# Patient Record
(demographics unavailable — no encounter records)

---

## 2024-11-17 NOTE — ASSESSMENT
[FreeTextEntry1] : ELLE DAVID is a 71 year old M who presents today Nov 16, 2024 with the above history and the following active issues:   Renal transplant eval has been deferred d/t oncologic hx for at least 5 years per patient If listed again will need surveillance cardiac imaging such as stress testing apt  will need to be coordinated between transplant team and electrophysiology  Although no recent AF has been reported, discussed risk of recurrent AF and associated risk of VTE. Pt prev unable to tolerate AC. Discussed with EP to arrange further eval for left atrial appendage occlusion. Their office to facilitate. Cont baby aspirin interim.   PAF Tachy charline S/P Micra AV PPM.  Last in office check 9/2024, cont remote monitoring Off amiodarone and eliquis  Eliquis has been on hold d/t bleeding complications post op (gastric ca), low platelets, and fall risk No recent bleeding issues, hgb is stable, but remains off anticoagulation Hem/onc is Dr. Oviedo. Monitor CBC.  CPAP for obstructive sleep apnea.   HTN/HHD/HF PEF/ ESRD Cardiomegaly Valvular heart disease, ao dil. Volume mgt with HD, off diuretics, nephrology follow up Stable echo findings- annual surveillance Intol GDMT d/t hypotension  Discussed red flag symptoms which would warrant urgent re-evaluation. Any questions and concerns were addressed and resolved.   Sincerely,  ROSA Tejada Patients history, testing, and plan reviewed with supervising MD: Dr. Surinder Richmond

## 2024-11-17 NOTE — HISTORY OF PRESENT ILLNESS
[FreeTextEntry1] : ELLE DAVID  is a 71 year old  M  History of longstanding refractory hypertension complicated by hypertensive heart disease, HF PEF, aortic dilatation, HD, mixed dyslipidemia, atherosclerosis, pre diabetes, PAF, AVB s/p PPM, chronic renal insufficiency now ESRD, obstructive sleep apnea, anemia, MGUS, prostate ca, prior colon cancer status post resection, gastric cancer, thrombocytopenia, neuropathy  Now receives dialysis via an AV fistula at [facility] end dialysis with Dr. German. 3x/wk.  underwent thrombectomy angioplasty and stenting of AV fistula  March 2021 symptomatic atrial flutter with symptoms of dizziness started on anticoagulation had LINETTE without clot, s/p DCCV unsuccessful had early recurrence of atrial arrhythmia started on amiodarone and anticoagulation  July '21 high degree HB noted on monitor with symptomatic dizziness and falls at home  S/P Micra AV PPM 7/27/21 [facility].  Off amio.   Followed by EP.  eveline was discussed  Pt reports has been off AC for quite some time.  He is tolerating baby aspirin  There is no exertional chest pain, pressure or discomfort.  There is no significant dyspnea on exertion or orthopnea.  THere is no evidence of fluid overload.   Last office visit he was referred to arrhythmia specialist  he remains off anticoagulation  there is discussion of left atrial appendage occlusion  there was discussion of transplant in [facility]  he reports this is being delayed for at least 5 years d/t oncologic hx there has been improvement in his hemodialysis  he has had less issues with tachycardia and hypotension   last office pacemaker check 9/2024 was unremarkable   Echo 4/2024 Grossly normal LV function, aortic root 4.1cm, asc ao 3.9cm, mild VHD Nuclear stress test 2/2024 small mild apical lateral fixed defect s/o attenuation  Carotid Doppler July 2022 mild nonobstructive atherosclerosis bilaterally

## 2024-11-17 NOTE — HISTORY OF PRESENT ILLNESS
Detail Level: Simple [FreeTextEntry1] : ELLE DAVID  is a 71 year old  M  History of longstanding refractory hypertension complicated by hypertensive heart disease, HF PEF, aortic dilatation, HD, mixed dyslipidemia, atherosclerosis, pre diabetes, PAF, AVB s/p PPM, chronic renal insufficiency now ESRD, obstructive sleep apnea, anemia, MGUS, prostate ca, prior colon cancer status post resection, gastric cancer, thrombocytopenia, neuropathy  Now receives dialysis via an AV fistula at [facility] end dialysis with Dr. German. 3x/wk.  underwent thrombectomy angioplasty and stenting of AV fistula  March 2021 symptomatic atrial flutter with symptoms of dizziness started on anticoagulation had LINETTE without clot, s/p DCCV unsuccessful had early recurrence of atrial arrhythmia started on amiodarone and anticoagulation  July '21 high degree HB noted on monitor with symptomatic dizziness and falls at home  S/P Micra AV PPM 7/27/21 [facility].  Off amio.   Followed by EP.  eveline was discussed  Pt reports has been off AC for quite some time.  He is tolerating baby aspirin  There is no exertional chest pain, pressure or discomfort.  There is no significant dyspnea on exertion or orthopnea.  THere is no evidence of fluid overload.   Last office visit he was referred to arrhythmia specialist  he remains off anticoagulation  there is discussion of left atrial appendage occlusion  there was discussion of transplant in [facility]  he reports this is being delayed for at least 5 years d/t oncologic hx there has been improvement in his hemodialysis  he has had less issues with tachycardia and hypotension   last office pacemaker check 9/2024 was unremarkable   Echo 4/2024 Grossly normal LV function, aortic root 4.1cm, asc ao 3.9cm, mild VHD Nuclear stress test 2/2024 small mild apical lateral fixed defect s/o attenuation  Carotid Doppler July 2022 mild nonobstructive atherosclerosis bilaterally  Detail Level: Zone

## 2024-11-17 NOTE — ADDENDUM
[FreeTextEntry1] : Please note the patient was reviewed with NP Tanisha Ruth. I was physically present during the service of the patient. I was directly involved in the management plan and recommendations of the care provided to the patient.  I personally reviewed the history and physical examination as documented by the NP above.

## 2025-03-14 NOTE — ASSESSMENT
[FreeTextEntry1] : ELLE DAVID is a 72 year old M who presents today Mar 14, 2025 with the above history and the following active issues:   Renal transplant eval has been deferred d/t oncologic hx for at least 5 years per patient (?2026) If listed again will need surveillance cardiac imaging such as stress testing apt will need to be coordinated between transplant team and electrophysiology   Although no recent AF has been reported, discussed risk of recurrent AF and associated risk of VTE. Now S/P watchman 12/27/24 LINETTE 3/3/25 Currently on DAPT pt reports no bleeding. Will touch base with EP re need for DAPT ? 6 months post Watchman per report. Call pt with any anticipated med changes. Bleeding precautions reviewed.   PAF Tachy charline S/P Micra AV PPM.  Last in office check 9/2024, cont remote monitoring Off amiodarone and eliquis  Eliquis has been on hold d/t bleeding complications post op (gastric ca), low platelets, and fall risk No recent bleeding issues, hgb is stable, but remains off anticoagulation Hem/onc is Dr. Oviedo. Monitor CBC.  CPAP for obstructive sleep apnea.   HTN/HHD/HF PEF/ ESRD Cardiomegaly Valvular heart disease, ao dil. Volume mgt with HD, off diuretics, nephrology follow up Stable echo findings- annual surveillance Intol GDMT d/t hypotension  Will plan next followup to reconcile meds Discussed red flag symptoms which would warrant urgent re-evaluation. Any questions and concerns were addressed and resolved.   Sincerely,   ROSA Tejada Patient's history, testing, medications, and any relative changes to plan of care reviewed with supervising MD: Dr. Surinder Richmond

## 2025-03-14 NOTE — HISTORY OF PRESENT ILLNESS
[FreeTextEntry1] : ELLE DAVID  is a 72 year old  M  History of longstanding refractory hypertension complicated by hypertensive heart disease, HF PEF, aortic dilatation, HD, mixed dyslipidemia, atherosclerosis, pre diabetes, PAF, AVB s/p PPM, chronic renal insufficiency now ESRD, obstructive sleep apnea, anemia, MGUS, prostate ca, prior colon cancer status post resection, gastric cancer, thrombocytopenia, neuropathy  Now receives dialysis via an AV fistula at [facility] end dialysis with Dr. German. 3x/wk.  underwent thrombectomy angioplasty and stenting of AV fistula  March 2021 symptomatic atrial flutter with symptoms of dizziness started on anticoagulation had LINETTE without clot, s/p DCCV unsuccessful had early recurrence of atrial arrhythmia started on amiodarone and anticoagulation  July '21 high degree HB noted on monitor with symptomatic dizziness and falls at home  S/P Micra AV PPM 7/27/21 [facility].  Off amio.   Followed by EP.  Has been off AC for quite some time.  He is tolerating baby aspirin  There is no exertional chest pain, pressure or discomfort.  There is no significant dyspnea on exertion or orthopnea.  There is no evidence of fluid overload.   Last office visit he was referred to arrhythmia specialist  he remains off anticoagulation  S/P watchman 12/2024  Pt states he is currently taking ASA 81mg and plavix 75mg, he denies bleeding events.  LINETTE 3/2025 LVEF 55% mild VHD, watchman closure present positioned normally, interatrial shunt, mild VHD  there was discussion of transplant in [facility]  he reports this is being delayed for at least 5 years d/t oncologic hx (maybe 2026 revisiting) there has been improvement in his hemodialysis  he has had less issues with tachycardia and hypotension   last office pacemaker check 9/2024 was unremarkable   Echo 4/2024 Grossly normal LV function, aortic root 4.1cm, asc ao 3.9cm, mild VHD Nuclear stress test 2/2024 small mild apical lateral fixed defect s/o attenuation  Carotid Doppler July 2022 mild nonobstructive atherosclerosis bilaterally

## 2025-07-14 NOTE — HISTORY OF PRESENT ILLNESS
[FreeTextEntry1] : ELLE DAVID  is a 72 year old  M Last office visit was March 25 interim device check was unremarkable this was performed remotely in March and in June last office device check was in September device follow-up will be scheduled he is now on single antiplatelet therapy today's EKG demonstrates atrial flutter prior infarct atrial flutter and infarct pattern is new as compared to prior EKG a follow-up echo and stress has been requested clinical follow-up after requested stress test outside blood work June 25 hemoglobin 13.0 LDL 99 A1c 4.7 also recommend addition of statin therapy for further cardiovascular risk reduction shared decision making.  Discussed importance of lipid-lowering therapy for cardiovascular risk reduction.  Start low-dose preventive statin therapy.  Follow-up blood work.  Instructed to call if adverse effect.  Clinical follow-up at the scheduled stress test and echo.  Instructed to notify our office of any new symptoms.  He remains active around the house.  No recent hospitalizations.  His blood pressure has been low at dialysis.  He is going to be listed for transplant if oncologic status remains stable History of longstanding refractory hypertension complicated by hypertensive heart disease, HF PEF, aortic dilatation, HD, mixed dyslipidemia, atherosclerosis, pre diabetes, PAF, AVB s/p PPM, chronic renal insufficiency now ESRD, obstructive sleep apnea, anemia, MGUS, prostate ca, prior colon cancer status post resection, gastric cancer, thrombocytopenia, neuropathy  Now receives dialysis via an AV fistula at [facility] with Dr. German. 3x/wk.  underwent thrombectomy angioplasty and stenting of AV fistula  March 2021 symptomatic atrial flutter with symptoms of dizziness started on anticoagulation had LINETTE without clot, s/p DCCV unsuccessful had early recurrence of atrial arrhythmia started on amiodarone and anticoagulation  July '21 high degree HB noted on monitor with symptomatic dizziness and falls at home  S/P Micra AV PPM 7/27/21 [facility].  Off amio.   Followed by EP.  remains off anticoagulation  S/P watchman 12/2024  LINETTE 3/2025 LVEF 55% mild VHD, watchman closure present positioned normally, interatrial shunt, mild VHD  there was discussion of transplant in [facility]  he reports this is being delayed for at least 5 years d/t oncologic hx (maybe 2026 revisiting) there has been improvement in his hemodialysis  he has had less issues with tachycardia and hypotension   Echo 4/2024 Grossly normal LV function, aortic root 4.1cm, asc ao 3.9cm, mild VHD Nuclear stress test 2/2024 small mild apical lateral fixed defect s/o attenuation  Carotid Doppler July 2022 mild nonobstructive atherosclerosis bilaterally  PAF Tachy charline S/P Micra AV PPM. S/P watchman 12/27/24 LINETTE 3/3/25 Last in office check 9/2024, cont remote monitoring Off amiodarone and eliquis  CPAP for obstructive sleep apnea.   HTN/HHD/HF PEF/ ESRD Cardiomegaly Valvular heart disease, ao dil. Volume mgt with HD, off diuretics, nephrology follow up Intol GDMT d/t hypotension  Discussed red flag symptoms which would warrant urgent re-evaluation. Any questions and concerns were addressed and resolved.

## 2025-07-14 NOTE — HISTORY OF PRESENT ILLNESS
[FreeTextEntry1] : ELLE DAVID  is a 72 year old  M Last office visit was March 25 interim device check was unremarkable this was performed remotely in March and in June last office device check was in September device follow-up will be scheduled he is now on single antiplatelet therapy today's EKG demonstrates atrial flutter prior infarct atrial flutter and infarct pattern is new as compared to prior EKG a follow-up echo and stress has been requested clinical follow-up after requested stress test outside blood work June 25 hemoglobin 13.0 LDL 99 A1c 4.7 also recommend addition of statin therapy for further cardiovascular risk reduction shared decision making.  Discussed importance of lipid-lowering therapy for cardiovascular risk reduction.  Start low-dose preventive statin therapy.  Follow-up blood work.  Instructed to call if adverse effect.  Clinical follow-up at the scheduled stress test and echo.  Instructed to notify our office of any new symptoms.  He remains active around the house.  No recent hospitalizations.  His blood pressure has been low at dialysis.  He is going to be listed for transplant if oncologic status remains stable History of longstanding refractory hypertension complicated by hypertensive heart disease, HF PEF, aortic dilatation, HD, mixed dyslipidemia, atherosclerosis, pre diabetes, PAF, AVB s/p PPM, chronic renal insufficiency now ESRD, obstructive sleep apnea, anemia, MGUS, prostate ca, prior colon cancer status post resection, gastric cancer, thrombocytopenia, neuropathy  Now receives dialysis via an AV fistula at [facility] with Dr. German. 3x/wk.  underwent thrombectomy angioplasty and stenting of AV fistula  March 2021 symptomatic atrial flutter with symptoms of dizziness started on anticoagulation had LINETTE without clot, s/p DCCV unsuccessful had early recurrence of atrial arrhythmia started on amiodarone and anticoagulation  July '21 high degree HB noted on monitor with symptomatic dizziness and falls at home  S/P Micra AV PPM 7/27/21 [facility].  Off amio.   Followed by EP.  remains off anticoagulation  S/P watchman 12/2024  LINETTE 3/2025 LVEF 55% mild VHD, watchman closure present positioned normally, interatrial shunt, mild VHD  there was discussion of transplant in [facility]  he reports this is being delayed for at least 5 years d/t oncologic hx (maybe 2026 revisiting) there has been improvement in his hemodialysis  he has had less issues with tachycardia and hypotension   Echo 4/2024 Grossly normal LV function, aortic root 4.1cm, asc ao 3.9cm, mild VHD Nuclear stress test 2/2024 small mild apical lateral fixed defect s/o attenuation  Carotid Doppler July 2022 mild nonobstructive atherosclerosis bilaterally  PAF Tachy charline S/P Micra AV PPM. S/P watchman 12/27/24 LINETTE 3/3/25 Last in office check 9/2024, cont remote monitoring Off amiodarone and eliquis  CPAP for obstructive sleep apnea.   HTN/HHD/HF PEF/ ESRD Cardiomegaly Valvular heart disease, ao dil. Volume mgt with HD, off diuretics, nephrology follow up Intol GDMT d/t hypotension  Discussed red flag symptoms which would warrant urgent re-evaluation. Any questions and concerns were addressed and resolved.